# Patient Record
Sex: FEMALE | Race: BLACK OR AFRICAN AMERICAN | Employment: PART TIME | URBAN - METROPOLITAN AREA
[De-identification: names, ages, dates, MRNs, and addresses within clinical notes are randomized per-mention and may not be internally consistent; named-entity substitution may affect disease eponyms.]

---

## 2021-12-26 ENCOUNTER — HOSPITAL ENCOUNTER (OUTPATIENT)
Age: 56
Discharge: HOME | End: 2021-12-26
Payer: COMMERCIAL

## 2021-12-26 VITALS
RESPIRATION RATE: 18 BRPM | TEMPERATURE: 97 F | SYSTOLIC BLOOD PRESSURE: 117 MMHG | OXYGEN SATURATION: 100 % | HEART RATE: 79 BPM | DIASTOLIC BLOOD PRESSURE: 74 MMHG

## 2021-12-26 DIAGNOSIS — U07.1 COVID-19: Primary | ICD-10-CM

## 2021-12-26 PROCEDURE — U0002 COVID-19 LAB TEST NON-CDC: HCPCS | Performed by: NURSE PRACTITIONER

## 2021-12-26 PROCEDURE — 99213 OFFICE O/P EST LOW 20 MIN: CPT | Performed by: NURSE PRACTITIONER

## 2021-12-26 PROCEDURE — 87880 STREP A ASSAY W/OPTIC: CPT | Performed by: NURSE PRACTITIONER

## 2021-12-26 NOTE — ED PROVIDER NOTES
Patient presents with:  Testing      HPI:     Gabriella Scott is a 55year old female who presents for a Covid test.  She has had a scratchy throat for the past couple days. No fevers. No coughing. No chest pain or difficulty breathing.   She is eating a stiffness.   CARDIO: RRR without murmur  EXTREMITIES: no cyanosis, edema, PAREDES without difficulty  HEAD: normocephalic  EYES: sclera non icteric bilateral, conjunctiva clear  EARS: TM  bilateral: normal  NOSE: nasal turbinates: pink, normal mucosa  THROAT: r

## (undated) NOTE — LETTER
[address] [id number] [phone]     Patient: Martinez Johnson   YOB: 1965   Date of Visit: 12/26/2021     Dear Employer,        December 26, 2021    At [facility], we are taking special preca discontinue isolation and other precautions 10 days after the date of their first positive RT-PCR test for SARS-CoV-2 RNA.     Persons who are asymptomatic but have been exposed, CDC recommends 14 days of quarantine after exposure based on the time it takes